# Patient Record
(demographics unavailable — no encounter records)

---

## 2024-10-28 NOTE — HISTORY OF PRESENT ILLNESS
[FreeTextEntry1] : 78 yo male recently admitted with lower back pain. He was found on CT scan to have a prostate of about 40 grams. CT also demonstrated a small lesion of his prostate. He recently had a PSA of 2.11 which he states is stable over the past few tests.  His BPH was associated with urinary frequency every hour and nocturia x 2-3.  He was started initially on tamsulosin however this made him feel tired so he had stopped.  He has been on finasteride for approximately 3 months  States that his urinary frequency has improved and he is urinating without bother.  He has noted 2 days of left-sided back and flank pain.  PVR - 6 ml

## 2024-10-28 NOTE — ASSESSMENT
[FreeTextEntry1] : 76 yo male with BPH and LUTS.  He has stopped tamsulosin because it caused fatigue.  He has been on finasteride for 3 months.  States his urinary symptoms were improved.  He is having some left-sided back and flank pain.  Will get a renal ultrasound  Plan Urinalysis Urine culture PVR done today 6 ml demonstrating patient emptying his bladder Renal ultrasound: Will call with results Continue finasteride 5 mg daily: prescription sent  FU 6 months    Patient is being seen today for evaluation and management of a chronic and longitudinal ongoing condition and I am of the primary treating physician.

## 2024-11-24 NOTE — PHYSICAL EXAM
[Normal Appearance] : normal appearance [Normal Conjunctiva] : the conjunctiva exhibited no abnormalities [Normal Jugular Venous A Waves Present] : normal jugular venous A waves present [Normal Jugular Venous V Waves Present] : normal jugular venous V waves present [Auscultation Breath Sounds / Voice Sounds] : lungs were clear to auscultation bilaterally [Heart Sounds] : normal S1 and S2 [Arterial Pulses Normal] : the arterial pulses were normal [Edema] : no peripheral edema present [Abdomen Soft] : soft [Abdomen Tenderness] : non-tender [] : no hepato-splenomegaly [Cyanosis, Localized] : no localized cyanosis [Skin Color & Pigmentation] : normal skin color and pigmentation [Oriented To Time, Place, And Person] : oriented to person, place, and time
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
## 2024-11-24 NOTE — REVIEW OF SYSTEMS
[Headache] : headache [Feeling Fatigued] : not feeling fatigued [Dyspnea on exertion] : not dyspnea during exertion [Chest Discomfort] : chest discomfort [Palpitations] : no palpitations [Joint Pain] : joint pain [Negative] : Heme/Lymph

## 2024-11-24 NOTE — REASON FOR VISIT
[FreeTextEntry1] : Mr. Mathew is a 78 yo M with anterior wall STEMI 9/30/13 s/p RAY to pLAD and RAY to pRamus 11/2014, with residual D1 disease with normal IFR.  Today he states that he has been having intermittent  left sided arm pain and chest pain, at rest. He checks his BPs during those times and levels are normal. He is compliant with Metoprolol and  Chlorthalidone. Today he is under alot of stress due to [place] news this morning.  He denies shortness of breath, palpitations.

## 2024-11-24 NOTE — ADDENDUM
[FreeTextEntry1] : Reviewed patient's bloodwork from PCP. sCR 1.3 (stable).  LDL 97 states he was not taking Atorvastatin due to forgetting it for a few weeks. Reiterated importance of it. Advised follow up with PCP for DM which is improved, A1C 7.4

## 2024-11-24 NOTE — REASON FOR VISIT
[FreeTextEntry1] : Mr. Mathew is a 76 yo M with anterior wall STEMI 9/30/13 s/p RAY to pLAD and RAY to pRamus 11/2014, with residual D1 disease with normal IFR.  Today he states that he has been having intermittent  left sided arm pain and chest pain, at rest. He checks his BPs during those times and levels are normal. He is compliant with Metoprolol and  Chlorthalidone. Today he is under alot of stress due to [place] this morning.  He denies shortness of breath, palpitations.
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
## 2025-04-28 NOTE — REASON FOR VISIT
[FreeTextEntry1] : Mr. Mathew is a 76 yo M with anterior wall STEMI 9/30/13 s/p RAY to pLAD and RAY to pRamus 11/2014, with residual D1 disease with normal IFR.  Today he states that he has not been feeling the arm and chest pain. He is compliant with Metoprolol and  Chlorthalidone as well as Amlodipine, newly started. He was recently visiting a Friend at [facility] and felt all of a sudden weak, had a syncopal event, was taken to the ER. He had labs which showed a sCr of 1.6. He was discharged and advised to follow up with Cardiology.
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
## 2025-05-01 NOTE — ASSESSMENT
[FreeTextEntry1] : 78 yo male with BPH and LUTS.  He has stopped tamsulosin because it caused fatigue.  He has been on finasteride for 3 months.  States his urinary symptoms were improved.  He is having some left-sided back and flank pain.  Will get a renal ultrasound  Plan Urinalysis Urine culture PVR done today 6 ml demonstrating patient emptying his bladder Renal ultrasound: Will call with results Continue finasteride 5 mg daily: prescription sent  FU 6 months    Patient is being seen today for evaluation and management of a chronic and longitudinal ongoing condition and I am of the primary treating physician.
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
